# Patient Record
Sex: FEMALE | Race: WHITE | NOT HISPANIC OR LATINO | Employment: OTHER
[De-identification: names, ages, dates, MRNs, and addresses within clinical notes are randomized per-mention and may not be internally consistent; named-entity substitution may affect disease eponyms.]

---

## 2017-11-17 ENCOUNTER — OFFICE VISIT (OUTPATIENT)
Dept: OTOLARYNGOLOGY | Facility: CLINIC | Age: 67
End: 2017-11-17

## 2017-11-17 DIAGNOSIS — Z41.1 ENCOUNTER FOR COSMETIC SURGERY: Primary | ICD-10-CM

## 2017-11-17 NOTE — NURSING NOTE
2d and 3d photos taken.      PREPROCEDURE: Botox and Filler  Yes Patient ID verified with 2 identifiers (name and  or MRN)  Yes: Procedure and site verified with patient/designee  Yes: Accurate consent documentation in medical record  Yes: Marking not required. Reason: Provider is in continuous attendance with the patient from consent through completion of procedure.     TIME OUT:  Yes: Time-Out performed immediately prior to starting procedure, including verbal and active participation of all team members addressing:  * Correct patient identity  * Confirmed that the correct side and site are marked  * An accurate procedure consent form  * Agreement on the procedure to be done  * Correct patient position  * Relevant images and results are properly labeled and appropriately displayed  * The need to administer antibiotics or fluids for irrigation purposes during the procedure as applicable  * Safety precations based on patient history or medication use    DURING PROCEDURE: Verification of correct person, site, and procedure occurs any time the responsibility for care of the patient is transferred to another member of the care team.               Tanika Baer RN  2017 3:35 PM

## 2017-11-17 NOTE — PROGRESS NOTES
Facial Plastic and Reconstructive Surgery    Procedure: Dermal Filler for Facial Contouring  Indication: Facial volume deficit  Injector: Dr. Cari Weber    The risks and benefits associated with dermal filler were discussed. Informed consent was obtained.  The skin was cleaned with antimicrobial solution and a topical ice mask was placed.   A 25 gauge needle was used to establish the entry point of the 27 gauge canula. The canula was used to inject the  a subcutaneous plane.  Intermittent ice was used topically throughout the procedure. Hemostasis was obtained at the needle entry site with light digital pressure. Ice was then applied to the face by cool pack for 10 minutes. The skin was cleaned.    A total of 2 mls of Voluma filler was used. 1  on the right cheek and 1 on the left cheek.  1 mls of Belotero used periorally      Please see scanned procedure log.    There were no complications and the patient tolerated the procedure well.      Procedure: Chemodenervation with Botulinum Toxin A  Indication: Undesirable Dynamic Rhytids  Injector: Dr. Cari Weber    The skin was cleaned with antimicrobial solution and a topical ice mask was placed.   The patient was asked to systematically engage the muscles in the area to be injected. The tuberculin needles were used for subdermal injection and hemostasis was obtained with digital pressure when needed. The skin was cleaned.     A total of 40 units was injected subdermally. Areas treated were : crow's feet, mentalis, platysma, corrugators  Please see scanned procedure log.    The patient tolerated the procedure well and there were no complications.

## 2017-11-17 NOTE — LETTER
11/17/2017       RE: Santiago Woodall  [address] [facility] [place] [address]     Dear Colleague,    Thank you for referring your patient, Santiago Woodall, to the [facility] ENT [facility] at [facility]. Please see a copy of my visit note below.    Facial Plastic and Reconstructive Surgery    Procedure: Dermal Filler for Facial Contouring  Indication: Facial volume deficit  Injector: Dr. Cari Weber    The risks and benefits associated with dermal filler were discussed. Informed consent was obtained.  The skin was cleaned with antimicrobial solution and a topical ice mask was placed.   A 25 gauge needle was used to establish the entry point of the 27 gauge canula. The canula was used to inject the  a subcutaneous plane.  Intermittent ice was used topically throughout the procedure. Hemostasis was obtained at the needle entry site with light digital pressure. Ice was then applied to the face by cool pack for 10 minutes. The skin was cleaned.    A total of 2 mls of Voluma filler was used. 1  on the right cheek and 1 on the left cheek.  1 mls of Belotero used periorally      Please see scanned procedure log.    There were no complications and the patient tolerated the procedure well.      Procedure: Chemodenervation with Botulinum Toxin A  Indication: Undesirable Dynamic Rhytids  Injector: Dr. Cari Weber    The skin was cleaned with antimicrobial solution and a topical ice mask was placed.   The patient was asked to systematically engage the muscles in the area to be injected. The tuberculin needles were used for subdermal injection and hemostasis was obtained with digital pressure when needed. The skin was cleaned.     A total of 40 units was injected subdermally. Areas treated were : crow's feet, mentalis, platysma, corrugators  Please see scanned procedure log.    The patient tolerated the procedure well and there were no complications.      Again, thank you for allowing me  to participate in the care of your patient.      Sincerely,    Cari Weber MD

## 2018-06-19 ENCOUNTER — OFFICE VISIT (OUTPATIENT)
Dept: OTOLARYNGOLOGY | Facility: CLINIC | Age: 68
End: 2018-06-19

## 2018-06-19 DIAGNOSIS — Z41.1 ELECTIVE PROCEDURE FOR UNACCEPTABLE COSMETIC APPEARANCE: Primary | ICD-10-CM

## 2018-06-19 NOTE — LETTER
6/19/2018       RE: Santiago Woodall  [address] [facility] [address]     Dear Colleague,    Thank you for referring your patient, Santiago Woodall, to the [facility] ENT [facility] at [facility]. Please see a copy of my visit note below.    Facial Plastic and Reconstructive Surgery    Facial Plastic and Reconstructive Surgery    Santiago Woodall presents for evaluation for facial aging. The patient has noticed undesirable cosmetic changes in the both the development of wrinkles and the loss of volume to the face. Santiago Woodall presents for a consultation for possible dermal filler and chemodenervation with Botox.    The patients face was evaluated with the use of a mirror and a white make up pencil to highlight the areas of concern. A recommendation was made for the optimal treatment regimen. The patient was educated on the nature of the procedures.    Dermal fillers were explained as well as the canula technique. Informed consent was obtained and noted in the chart. All questions were answered  prior to commencement of the procedure.     Botox treatment was explained and informed consent was obtained. This was documented in the chart. All questions were answered prior to proceeding.     Procedure: Dermal Filler for Facial Contouring  Indication: Facial volume deficit  Injector: Dr. Cari Weber    The skin was cleaned with antimicrobial solution and a topical ice mask was placed.   A 26 gauge needle was used to establish the entry point of the 27 gauge canula. The canula was used to inject the  a subcutaneous plane.  Intermittent ice was used topically throughout the procedure. Hemostasis was obtained at the needle entry site with light digital pressure. Ice was then applied to the face by cool pack for 10 minutes. The skin was cleaned.    Voluma and Belotero were used.   Please see scanned procedure log.    There were no complications and the patient tolerated the procedure  well.    Procedure: Chemodenervation with Botulinum Toxin A  Indication: Undesirable Dynamic Rhytids  Injector: Dr. Cari Weber    The skin was cleaned with antimicrobial solution and a topical ice mask was placed.   The patient was asked to systematically engage the muscles in the area to be injected. The tuberculin needles were used for subdermal injection and hemostasis was obtained with digital pressure when needed. The skin was cleaned.     Please see scanned procedure log.    The patient tolerated the procedure well and there were no complications.           Again, thank you for allowing me to participate in the care of your patient.      Sincerely,    Cari Weber MD

## 2018-06-19 NOTE — NURSING NOTE
Updated 2D and 3D photos obtained.  Also obtained consent for Botox and dermal Filler - see treatment sheets.        PREPROCEDURE:  Botox and Filler  Yes Patient ID verified with 2 identifiers (name and  or MRN)  Yes: Procedure and site verified with patient/designee  Yes: Accurate consent documentation in medical record  Yes: Marking not required. Reason: Provider is in continuous attendance with the patient from consent through completion of procedure.     TIME OUT:  Yes: Time-Out performed immediately prior to starting procedure, including verbal and active participation of all team members addressing:  * Correct patient identity  * Confirmed that the correct side and site are marked  * An accurate procedure consent form  * Agreement on the procedure to be done  * Correct patient position  * Relevant images and results are properly labeled and appropriately displayed  * The need to administer antibiotics or fluids for irrigation purposes during the procedure as applicable  * Safety precations based on patient history or medication use    DURING PROCEDURE: Verification of correct person, site, and procedure occurs any time the responsibility for care of the patient is transferred to another member of the care team.             .  Tanika Baer RN  2018 5:47 PM

## 2018-07-05 NOTE — PROGRESS NOTES
Facial Plastic and Reconstructive Surgery    Facial Plastic and Reconstructive Surgery    Santiago Woodall presents for evaluation for facial aging. The patient has noticed undesirable cosmetic changes in the both the development of wrinkles and the loss of volume to the face. Santiago Woodall presents for a consultation for possible dermal filler and chemodenervation with Botox.    The patients face was evaluated with the use of a mirror and a white make up pencil to highlight the areas of concern. A recommendation was made for the optimal treatment regimen. The patient was educated on the nature of the procedures.    Dermal fillers were explained as well as the canula technique. Informed consent was obtained and noted in the chart. All questions were answered  prior to commencement of the procedure.     Botox treatment was explained and informed consent was obtained. This was documented in the chart. All questions were answered prior to proceeding.     Procedure: Dermal Filler for Facial Contouring  Indication: Facial volume deficit  Injector: Dr. Cari Weber    The skin was cleaned with antimicrobial solution and a topical ice mask was placed.   A 26 gauge needle was used to establish the entry point of the 27 gauge canula. The canula was used to inject the  a subcutaneous plane.  Intermittent ice was used topically throughout the procedure. Hemostasis was obtained at the needle entry site with light digital pressure. Ice was then applied to the face by cool pack for 10 minutes. The skin was cleaned.    Voluma and Belotero were used.   Please see scanned procedure log.    There were no complications and the patient tolerated the procedure well.    Procedure: Chemodenervation with Botulinum Toxin A  Indication: Undesirable Dynamic Rhytids  Injector: Dr. Cari Weber    The skin was cleaned with antimicrobial solution and a topical ice mask was placed.   The patient was asked to systematically engage  the muscles in the area to be injected. The tuberculin needles were used for subdermal injection and hemostasis was obtained with digital pressure when needed. The skin was cleaned.     Please see scanned procedure log.    The patient tolerated the procedure well and there were no complications.

## 2018-10-11 ENCOUNTER — DOCUMENTATION ONLY (OUTPATIENT)
Dept: PLASTIC SURGERY | Facility: CLINIC | Age: 68
End: 2018-10-11

## 2018-10-11 ENCOUNTER — OFFICE VISIT (OUTPATIENT)
Dept: OTOLARYNGOLOGY | Facility: CLINIC | Age: 68
End: 2018-10-11

## 2018-10-11 DIAGNOSIS — Z41.1 ENCOUNTER FOR COSMETIC PROCEDURE: Primary | ICD-10-CM

## 2018-10-11 NOTE — PROGRESS NOTES
Service Date: 10/11/2018      Ms. Woodall is back to see us today, and we talked about further improvement of rhytids and expression lines.  We used 1 mL of Restylane, and that was injected into the marionette lines and along the vermilion border of the upper lip.  We then placed Botox for 36 units into the  procerus, depressor supercilii and lateral orbital regions.  She will let us know how that works out, and she will see us on an as-needed basis.         LIDA ALATORRE MD             D: 10/11/2018   T: 10/11/2018   MT: yani      Name:     HEMAL WOODALL   MRN:      [id number]        Account:      [id number]   :      1950           Service Date: 10/11/2018      Document: [id number]

## 2018-10-11 NOTE — LETTER
10/11/2018       RE: Santiago Woodall  [address] [facility] [address]     Dear Colleague,    Thank you for referring your patient, Santiago Woodall, to the [facility] at [facility]. Please see a copy of my visit note below.    This office note has been dictated.     Again, thank you for allowing me to participate in the care of your patient.      Sincerely,    LIDA ALATORRE MD

## 2018-10-11 NOTE — PROGRESS NOTES
Reviewed filler and botox expectations and post care instructions.  Patient to schedule follow up if needed.   Irais Oliver, Patient Coordinator

## 2018-10-31 RX ORDER — RALOXIFENE HYDROCHLORIDE 60 MG/1
60 TABLET, FILM COATED ORAL
COMMUNITY
Start: 2018-01-05

## 2019-01-11 ENCOUNTER — OFFICE VISIT (OUTPATIENT)
Dept: OTOLARYNGOLOGY | Facility: CLINIC | Age: 69
End: 2019-01-11

## 2019-01-11 DIAGNOSIS — Z41.1 ENCOUNTER FOR COSMETIC PROCEDURE: Primary | ICD-10-CM

## 2019-01-11 NOTE — PROGRESS NOTES
Facial Plastic and Reconstructive Surgery    Santiago Woodall presents for evaluation for facial aging. The patient has noticed undesirable cosmetic changes in the both the development of wrinkles and the loss of volume to the face. Santiago Woodall presents for a consultation for possible dermal filler and chemodenervation with Botox.    The patients face was evaluated with the use of a mirror and a white make up pencil to highlight the areas of concern. A recommendation was made for the optimal treatment regimen. The patient was educated on the nature of the procedures.    Dermal fillers were explained as well as the canula technique. Informed consent was obtained and noted in the chart. All questions were answered  prior to commencement of the procedure.     Botox treatment was explained and informed consent was obtained. This was documented in the chart. All questions were answered prior to proceeding.     Procedure: Dermal Filler for Facial Contouring  Indication: Facial volume deficit  Injector: Dr. Cari Weber    The skin was cleaned with antimicrobial solution and a topical ice mask was placed.   A 25 gauge needle was used to establish the entry point of the 27 gauge canula. The canula was used to inject the  a subcutaneous plane.  Intermittent ice was used topically throughout the procedure. Hemostasis was obtained at the needle entry site with light digital pressure. Ice was then applied to the face by cool pack for 10 minutes. The skin was cleaned.    1ml of Voluma  0.5 ml of Volbella  1 ml of Belotero    There were no complications and the patient tolerated the procedure well.    Procedure: Chemodenervation with Botulinum Toxin A  Indication: Undesirable Dynamic Rhytids  Injector: Dr. Cari Weber    The skin was cleaned with antimicrobial solution and a topical ice mask was placed.   The patient was asked to systematically engage the muscles in the area to be injected. The tuberculin needles  were used for subdermal injection and hemostasis was obtained with digital pressure when needed. The skin was cleaned.     A total of 5 units was injected subdermally. Please see scanned procedure log.    The patient tolerated the procedure well and there were no complications.

## 2019-01-11 NOTE — LETTER
1/11/2019       RE: Santiago Woodall  [address] [facility] [address]     Dear Colleague,    Thank you for referring your patient, Santiago Woodall, to the [facility] ENT [facility] at [facility]. Please see a copy of my visit note below.    Facial Plastic and Reconstructive Surgery    Santiago Woodall presents for evaluation for facial aging. The patient has noticed undesirable cosmetic changes in the both the development of wrinkles and the loss of volume to the face. Santiago Woodall presents for a consultation for possible dermal filler and chemodenervation with Botox.    The patients face was evaluated with the use of a mirror and a white make up pencil to highlight the areas of concern. A recommendation was made for the optimal treatment regimen. The patient was educated on the nature of the procedures.    Dermal fillers were explained as well as the canula technique. Informed consent was obtained and noted in the chart. All questions were answered  prior to commencement of the procedure.     Botox treatment was explained and informed consent was obtained. This was documented in the chart. All questions were answered prior to proceeding.     Procedure: Dermal Filler for Facial Contouring  Indication: Facial volume deficit  Injector: Dr. Cari Weber    The skin was cleaned with antimicrobial solution and a topical ice mask was placed.   A 25 gauge needle was used to establish the entry point of the 27 gauge canula. The canula was used to inject the  a subcutaneous plane.  Intermittent ice was used topically throughout the procedure. Hemostasis was obtained at the needle entry site with light digital pressure. Ice was then applied to the face by cool pack for 10 minutes. The skin was cleaned.    1ml of Voluma  0.5 ml of Volbella  1 ml of Belotero    There were no complications and the patient tolerated the procedure well.    Procedure: Chemodenervation with Botulinum Toxin  A  Indication: Undesirable Dynamic Rhytids  Injector: Dr. Cari Weber    The skin was cleaned with antimicrobial solution and a topical ice mask was placed.   The patient was asked to systematically engage the muscles in the area to be injected. The tuberculin needles were used for subdermal injection and hemostasis was obtained with digital pressure when needed. The skin was cleaned.     A total of 5 units was injected subdermally. Please see scanned procedure log.    The patient tolerated the procedure well and there were no complications.     Again, thank you for allowing me to participate in the care of your patient.      Sincerely,    Cari Weber MD

## 2019-01-14 NOTE — NURSING NOTE
Updated photodocumentation obtained.      Obtained written and verbal consent for Dermal Filler.  Discussed signs and symptoms of Intravascular occlusion and to call clinic immediately should there be any concern or questions.  Also discussed the other risk factors including but not limited to bruising, swelling, infection, soft tissue necrosis, blindness, pain, redness.    Prepped for procedure with TechniCare Chlorhexidine topical solution to skin, applied topical Prilocaine and Lidocaine for pre-procedure numbing.  Used intermittent ice for comfort.  Patient tolerated procedure well.     Post procedure instructions discussed with patient.  No make-up for 4 hours, drink plenty of water to stay hydrated.  Avoid any retinols for 48 hours.  Hold off off on any skin treatments such as Microneedling or Hydrafacials for 5 days after unless directed otherwise by your physician.    Tanika Baer RN  1/14/2019 10:05 AM

## 2019-05-02 ENCOUNTER — OFFICE VISIT (OUTPATIENT)
Dept: PLASTIC SURGERY | Facility: CLINIC | Age: 69
End: 2019-05-02

## 2019-05-02 DIAGNOSIS — Z41.1 ENCOUNTER FOR COSMETIC PROCEDURE: Primary | ICD-10-CM

## 2019-05-02 NOTE — NURSING NOTE
Chief Complaint   Patient presents with     RECHECK     botox and filler     Patient likes her results and is here for more botox and filler.  We reviewed post care and expectations and she will follow up as needed. Irias Oliver, Patient Coordinator

## 2019-05-02 NOTE — LETTER
2019      RE: Hemal Woodall  [address] [facility] [address]     Service Date: 2019      Ms. Woodall is in today and we placed 36 units of Botox into the , procerus and frontalis areas, as well as lateral orbit and then 1 mL of Restylane soak was placed in the oral commissure group, marionette lines, secondary smile lines and along the vermilion border of the upper lip.  None was placed in the lower lip as she has an active herpetic eruption in the midline.  This not an area we generally treat anyway.  She had Voluma, Volbella as well as Belotero in the past.  She is now back for the summer from  and she has significant tanning but has tried to protect her face in her journeys there.         LIDA ALATORRE MD             D: 2019   T: 2019   MT: zhanna      Name:     HEMAL WOODALL   MRN:      [id number]        Account:      [id number]   :      1950           Service Date: 2019      Document: [id number]       LIDA ALATORRE MD

## 2019-05-02 NOTE — LETTER
May 2, 2019  Re: Santiago Woodall  1950    Dear Dr. Rendon,    Thank you so much for referring Santiago Woodall to the [facility]. I had the pleasure of visiting with Santiago today.     Attached you will find a copy of my note. Please feel free to reach out to me with any questions, [phone].     This office note has been dictated.       Your trust in our practice and care is much appreciated.    Sincerely,  LIDA ALATORRE MD

## 2019-05-03 NOTE — PROGRESS NOTES
Service Date: 2019      Ms. Woodall is in today and we placed 36 units of Botox into the , procerus and frontalis areas, as well as lateral orbit and then 1 mL of Restylane soak was placed in the oral commissure group, marionette lines, secondary smile lines and along the vermilion border of the upper lip.  None was placed in the lower lip as she has an active herpetic eruption in the midline.  This not an area we generally treat anyway.  She had Voluma, Volbella as well as Belotero in the past.  She is now back for the summer from [place] and she has significant tanning but has tried to protect her face in her journeys there.         LIDA ALATORRE MD             D: 2019   T: 2019   MT: zhanna      Name:     HEMAL WOODALL   MRN:      [id number]        Account:      [id number]   :      1950           Service Date: 2019      Document: [id number]

## 2019-08-02 ENCOUNTER — OFFICE VISIT (OUTPATIENT)
Dept: PLASTIC SURGERY | Facility: CLINIC | Age: 69
End: 2019-08-02

## 2019-08-02 DIAGNOSIS — Z41.1 ENCOUNTER FOR COSMETIC PROCEDURE: Primary | ICD-10-CM

## 2019-08-02 NOTE — NURSING NOTE
Obtained written and verbal consent for Dermal Filler.  Discussed signs and symptoms of Intravascular occlusion and to call clinic immediately should there be any concern or questions.  Also discussed the other risk factors including but not limited to bruising, swelling, infection, soft tissue necrosis, blindness, pain, redness.    Prepped for procedure with TechniCare Chlorhexidine topical solution to skin, applied topical Prilocaine and Lidocaine for pre-procedure numbing.  Used intermittent ice for comfort.  Patient tolerated procedure well.     Post procedure instructions discussed with patient.  No make-up for 4 hours, drink plenty of water to stay hydrated.  Avoid any retinols for 48 hours.  Hold off off on any skin treatments such as Microneedling or Hydrafacials for 5 days after unless directed otherwise by your physician.    Patient is interested in consult for Mini Facelift.  Will do that at her next Botox appt in 3 months.    Tanika Baer RN  8/2/2019 4:40 PM

## 2019-08-02 NOTE — LETTER
August 2, 2019  Re: Santiago Woodall  1950    Facial Plastic and Reconstructive Surgery    Procedure: Dermal Filler for Facial Contouring  Indication: Facial volume deficit  Injector: Cari Weber    The risks and benefits associated with dermal filler were discussed. Informed consent was obtained specifically addressing risks including intravascular injection. The skin was cleaned with antimicrobial solution and a topical ice was placed. If the patient elected topical anesthetic solution, this was placed.     A needle was used to establish the entry point of the canula. The canula and needle were used to inject the filler with slow cautious consistent flow with careful attention to the danger zones of the face.      Intermittent ice was used topically throughout the procedure. Hemostasis was obtained at the needle entry site with light digital pressure. The skin was cleaned.    Filler Type: Radiesse   Total Amount of Filler: Voluma    Please see procedure log.    There were no complications and the patient tolerated the procedure well.  Post procedure care instructions were given to the patient.    Facial Plastic and Reconstructive Surgery    Procedure: Chemodenervation with Botulinum Toxin A  Indication: Undesirable Dynamic Rhytids  Injector: Cari Weber    Informed consent was obtained.  The skin was cleaned with antimicrobial solution and a topical ice was placed.     The patient was asked to systematically engage the muscles in the area to be injected. The tuberculin needles were used for subdermal injection and hemostasis was obtained with light digital pressure when needed. The skin was cleaned.     A total of 30 units were injected.  Botulinum toxin A type: Botox    Please see procedure log.    The patient tolerated the procedure well and there were no complications. Post procedure care instructions were given to the patient.    Your trust in our practice and care is much  appreciated.    Sincerely,  Cari Weber MD

## 2019-08-02 NOTE — PROGRESS NOTES
Facial Plastic and Reconstructive Surgery    Procedure: Dermal Filler for Facial Contouring  Indication: Facial volume deficit  Injector: Cari Weber      The risks and benefits associated with dermal filler were discussed. Informed consent was obtained specifically addressing risks including intravascular injection. The skin was cleaned with antimicrobial solution and a topical ice was placed. If the patient elected topical anesthetic solution, this was placed.     A needle was used to establish the entry point of the canula. The canula and needle were used to inject the filler with slow cautious consistent flow with careful attention to the danger zones of the face.      Intermittent ice was used topically throughout the procedure. Hemostasis was obtained at the needle entry site with light digital pressure. The skin was cleaned.    Filler Type: Radiesse   Total Amount of Filler: Voluma    Please see procedure log.    There were no complications and the patient tolerated the procedure well.  Post procedure care instructions were given to the patient.    Facial Plastic and Reconstructive Surgery    Procedure: Chemodenervation with Botulinum Toxin A  Indication: Undesirable Dynamic Rhytids  Injector: Cari Weber      Informed consent was obtained.  The skin was cleaned with antimicrobial solution and a topical ice was placed.     The patient was asked to systematically engage the muscles in the area to be injected. The tuberculin needles were used for subdermal injection and hemostasis was obtained with light digital pressure when needed. The skin was cleaned.     A total of 30 units were injected.  Botulinum toxin A type: Botox    Please see procedure log.    The patient tolerated the procedure well and there were no complications. Post procedure care instructions were given to the patient.

## 2020-01-17 ENCOUNTER — OFFICE VISIT (OUTPATIENT)
Dept: PLASTIC SURGERY | Facility: CLINIC | Age: 70
End: 2020-01-17

## 2020-01-17 DIAGNOSIS — Z41.1 ENCOUNTER FOR COSMETIC PROCEDURE: Primary | ICD-10-CM

## 2020-01-17 NOTE — LETTER
1/17/2020       RE: Santiago Woodall  [address] [address]     Dear Colleague,    Thank you for referring your patient, Santiago Woodall, to the [facility] at [facility]. Please see a copy of my visit note below.    Facial Plastic and Reconstructive Surgery    Santiago Woodall presents for evaluation for facial aging. The patient has noticed undesirable cosmetic changes in the both the development of wrinkles and the loss of volume to the face. Santiago Woodall presents for a consultation for possible dermal filler and chemodenervation with Botox.    The patients face was evaluated with the use of a mirror and a white make up pencil to highlight the areas of concern. A recommendation was made for the optimal treatment regimen. The patient was educated on the nature of the procedures.    Dermal fillers were explained as well as the canula technique. Informed consent was obtained and noted in the chart. All questions were answered  prior to commencement of the procedure.     Botox treatment was explained and informed consent was obtained. This was documented in the chart. All questions were answered prior to proceeding.     Procedure: Dermal Filler for Facial Contouring  Indication: Facial volume deficit  Injector: Dr. Cari Weber    The skin was cleaned with antimicrobial solution and a topical ice mask was placed.   A 27 gauge needle was used to inject the  a subcutaneous plane.  Intermittent ice was used topically throughout the procedure. Hemostasis was obtained at the needle entry site with light digital pressure. Ice was then applied to the face by cool pack for 10 minutes. The skin was cleaned.    A total of 0.8 mls filler of Radiesse was used.    1 ml of Belotero  Please see scanned procedure log.    There were no complications and the patient tolerated the procedure well.    Procedure: Chemodenervation with Botulinum Toxin A  Indication: Undesirable  Dynamic Rhytids  Injector: Dr. Cari Weber    The skin was cleaned with antimicrobial solution and a topical ice mask was placed.   The patient was asked to systematically engage the muscles in the area to be injected. The tuberculin needles were used for subdermal injection and hemostasis was obtained with digital pressure when needed. The skin was cleaned.     A total of 30 units was injected subdermally. Areas treated were : Botox  Please see scanned procedure log.    The patient tolerated the procedure well and there were no complications.     Again, thank you for allowing me to participate in the care of your patient.      Sincerely,    Cari Weber MD

## 2020-01-17 NOTE — NURSING NOTE
Obtained consent for Botulinum Toxin.  Discussed possible side effects including but not limited to unwanted facial weakness or paralysis, bruising, swelling, redness, pain, upper eyelid ptosis.  Botox will begin to work in 4 - 5 days, fully working by 10 days.  Botox lasts for approximately 3 months.      Prepped skin with isopropyl alcohol and gauze, intermittent ice for comfort.  Patient tolerated procedure well.        Obtained written and verbal consent for Dermal Filler.  Discussed signs and symptoms of Intravascular occlusion and to call clinic immediately should there be any concern or questions.  Also discussed the other risk factors including but not limited to bruising, swelling, infection, soft tissue necrosis, blindness, pain, redness.    Prepped for procedure with [employer]iCare Chlorhexidine topical solution to skin, applied topical Prilocaine and Lidocaine for pre-procedure numbing.  Used intermittent ice for comfort.  Patient tolerated procedure well.     Post procedure instructions discussed with patient.  No make-up for 4 hours, drink plenty of water to stay hydrated.  Avoid any retinols for 48 hours.  Hold off off on any skin treatments such as Microneedling or Hydrafacials for 5 days after unless directed otherwise by your physician.    Received: 0.8 mL Radiesse                       1mL Jessica Martin RN  1/17/2020 2:53 PM

## 2020-01-17 NOTE — PROGRESS NOTES
Facial Plastic and Reconstructive Surgery    Santiago Woodall presents for evaluation for facial aging. The patient has noticed undesirable cosmetic changes in the both the development of wrinkles and the loss of volume to the face. Santiago Woodall presents for a consultation for possible dermal filler and chemodenervation with Botox.    The patients face was evaluated with the use of a mirror and a white make up pencil to highlight the areas of concern. A recommendation was made for the optimal treatment regimen. The patient was educated on the nature of the procedures.    Dermal fillers were explained as well as the canula technique. Informed consent was obtained and noted in the chart. All questions were answered  prior to commencement of the procedure.     Botox treatment was explained and informed consent was obtained. This was documented in the chart. All questions were answered prior to proceeding.     Procedure: Dermal Filler for Facial Contouring  Indication: Facial volume deficit  Injector: Dr. Cari Weber    The skin was cleaned with antimicrobial solution and a topical ice mask was placed.   A 27 gauge needle was used to inject the  a subcutaneous plane.  Intermittent ice was used topically throughout the procedure. Hemostasis was obtained at the needle entry site with light digital pressure. Ice was then applied to the face by cool pack for 10 minutes. The skin was cleaned.    A total of 0.8 mls filler of Radiesse was used.    1 ml of Belotero  Please see scanned procedure log.    There were no complications and the patient tolerated the procedure well.    Procedure: Chemodenervation with Botulinum Toxin A  Indication: Undesirable Dynamic Rhytids  Injector: Dr. Cari Weber    The skin was cleaned with antimicrobial solution and a topical ice mask was placed.   The patient was asked to systematically engage the muscles in the area to be injected. The tuberculin needles were used for  subdermal injection and hemostasis was obtained with digital pressure when needed. The skin was cleaned.     A total of 30 units was injected subdermally. Areas treated were : Botox  Please see scanned procedure log.    The patient tolerated the procedure well and there were no complications.

## 2020-01-17 NOTE — LETTER
January 17, 2020  Re: Santiago Woodall  1950    Facial Plastic and Reconstructive Surgery    Santiago Woodall presents for evaluation for facial aging. The patient has noticed undesirable cosmetic changes in the both the development of wrinkles and the loss of volume to the face. Santiago Woodall presents for a consultation for possible dermal filler and chemodenervation with Botox.    The patients face was evaluated with the use of a mirror and a white make up pencil to highlight the areas of concern. A recommendation was made for the optimal treatment regimen. The patient was educated on the nature of the procedures.    Dermal fillers were explained as well as the canula technique. Informed consent was obtained and noted in the chart. All questions were answered  prior to commencement of the procedure.     Botox treatment was explained and informed consent was obtained. This was documented in the chart. All questions were answered prior to proceeding.     Procedure: Dermal Filler for Facial Contouring  Indication: Facial volume deficit  Injector: Dr. Cari Weber    The skin was cleaned with antimicrobial solution and a topical ice mask was placed.   A 27 gauge needle was used to inject the  a subcutaneous plane.  Intermittent ice was used topically throughout the procedure. Hemostasis was obtained at the needle entry site with light digital pressure. Ice was then applied to the face by cool pack for 10 minutes. The skin was cleaned.    A total of 0.8 mls filler of Radiesse was used.    1 ml of Belotero  Please see scanned procedure log.    There were no complications and the patient tolerated the procedure well.    Procedure: Chemodenervation with Botulinum Toxin A  Indication: Undesirable Dynamic Rhytids  Injector: Dr. Cari Weber    The skin was cleaned with antimicrobial solution and a topical ice mask was placed.   The patient was asked to systematically engage the muscles in the area to be  injected. The tuberculin needles were used for subdermal injection and hemostasis was obtained with digital pressure when needed. The skin was cleaned.     A total of 30 units was injected subdermally. Areas treated were : Botox  Please see scanned procedure log.    The patient tolerated the procedure well and there were no complications.     Your trust in our practice and care is much appreciated.    Sincerely,  Cari Weber MD

## 2020-09-11 ENCOUNTER — OFFICE VISIT (OUTPATIENT)
Dept: PLASTIC SURGERY | Facility: CLINIC | Age: 70
End: 2020-09-11

## 2020-09-11 DIAGNOSIS — Z41.1 ENCOUNTER FOR COSMETIC PROCEDURE: Primary | ICD-10-CM

## 2020-09-11 NOTE — PROGRESS NOTES
Facial Plastic and Reconstructive Surgery    Procedure: Chemodenervation with Botulinum Toxin A  Indication: Undesirable Dynamic Rhytids  Injector: Cari Weber MD      Informed consent was obtained.  The skin was cleaned with antimicrobial solution and a topical ice was placed.     The patient was asked to systematically engage the muscles in the area to be injected. The tuberculin needles were used for subdermal injection and hemostasis was obtained with light digital pressure when needed. The skin was cleaned.     A total of 45 units were injected.  Botulinum toxin A type: Botox    Please see procedure log.    The patient tolerated the procedure well and there were no complications. Post procedure care instructions were given to the patient.      Facial Plastic and Reconstructive Surgery    Procedure: Dermal Filler for Facial Contouring  Indication: Facial volume deficit  Injector: Cari Weber MD      The risks and benefits associated with dermal filler were discussed. Informed consent was obtained specifically addressing risks including intravascular injection. The skin was cleaned with antimicrobial solution and a topical ice was placed. If the patient elected topical anesthetic solution, this was placed.     A needle was used to establish the entry point of the canula. The canula and needle were used to inject the filler with slow cautious consistent flow with careful attention to the danger zones of the face.      Intermittent ice was used topically throughout the procedure. Hemostasis was obtained at the needle entry site with light digital pressure. The skin was cleaned.    Filler Type: Belotero and Diluted restylane    Please see procedure log.    There were no complications and the patient tolerated the procedure well.  Post procedure care instructions were given to the patient.    I also removed a large pore from her chest.

## 2020-09-11 NOTE — LETTER
September 11, 2020  Re: Santiago Woodall  1950    Dear Dr. Rendon,    Thank you so much for referring Santiago Woodall to the [facility]. I had the pleasure of visiting with Santiago today.     Attached you will find a copy of my note. Please feel free to reach out to me with any questions, [phone].     Facial Plastic and Reconstructive Surgery    Procedure: Chemodenervation with Botulinum Toxin A  Indication: Undesirable Dynamic Rhytids  Injector: Cari Weber MD      Informed consent was obtained.  The skin was cleaned with antimicrobial solution and a topical ice was placed.     The patient was asked to systematically engage the muscles in the area to be injected. The tuberculin needles were used for subdermal injection and hemostasis was obtained with light digital pressure when needed. The skin was cleaned.     A total of 45 units were injected.  Botulinum toxin A type: Botox    Please see procedure log.    The patient tolerated the procedure well and there were no complications. Post procedure care instructions were given to the patient.      Facial Plastic and Reconstructive Surgery    Procedure: Dermal Filler for Facial Contouring  Indication: Facial volume deficit  Injector: Cari Weber MD      The risks and benefits associated with dermal filler were discussed. Informed consent was obtained specifically addressing risks including intravascular injection. The skin was cleaned with antimicrobial solution and a topical ice was placed. If the patient elected topical anesthetic solution, this was placed.     A needle was used to establish the entry point of the canula. The canula and needle were used to inject the filler with slow cautious consistent flow with careful attention to the danger zones of the face.      Intermittent ice was used topically throughout the procedure. Hemostasis was obtained at the needle entry site with light digital pressure. The skin was cleaned.    Filler Type:  Belotero and Diluted restylane    Please see procedure log.    There were no complications and the patient tolerated the procedure well.  Post procedure care instructions were given to the patient.    I also removed a large pore from her chest.     Your trust in our practice and care is much appreciated.    Sincerely,  Cari Weber MD

## 2020-09-11 NOTE — LETTER
9/11/2020       RE: Santiago Woodall  [address] [facility] [address]     Dear Colleague,    Thank you for referring your patient, Santiago Woodall, to the [facility] at [facility]. Please see a copy of my visit note below.    Facial Plastic and Reconstructive Surgery    Procedure: Chemodenervation with Botulinum Toxin A  Indication: Undesirable Dynamic Rhytids  Injector: Cari Weber MD      Informed consent was obtained.  The skin was cleaned with antimicrobial solution and a topical ice was placed.     The patient was asked to systematically engage the muscles in the area to be injected. The tuberculin needles were used for subdermal injection and hemostasis was obtained with light digital pressure when needed. The skin was cleaned.     A total of 45 units were injected.  Botulinum toxin A type: Botox    Please see procedure log.    The patient tolerated the procedure well and there were no complications. Post procedure care instructions were given to the patient.      Facial Plastic and Reconstructive Surgery    Procedure: Dermal Filler for Facial Contouring  Indication: Facial volume deficit  Injector: Cari Weber MD      The risks and benefits associated with dermal filler were discussed. Informed consent was obtained specifically addressing risks including intravascular injection. The skin was cleaned with antimicrobial solution and a topical ice was placed. If the patient elected topical anesthetic solution, this was placed.     A needle was used to establish the entry point of the canula. The canula and needle were used to inject the filler with slow cautious consistent flow with careful attention to the danger zones of the face.      Intermittent ice was used topically throughout the procedure. Hemostasis was obtained at the needle entry site with light digital pressure. The skin was cleaned.    Filler Type: Belotero and Diluted restylane    Please  see procedure log.    There were no complications and the patient tolerated the procedure well.  Post procedure care instructions were given to the patient.    I also removed a large pore from her chest.     Again, thank you for allowing me to participate in the care of your patient.      Sincerely,    Cari Weber MD

## 2020-11-06 ENCOUNTER — OFFICE VISIT (OUTPATIENT)
Dept: PLASTIC SURGERY | Facility: CLINIC | Age: 70
End: 2020-11-06

## 2020-11-06 DIAGNOSIS — Z41.1 ENCOUNTER FOR COSMETIC PROCEDURE: Primary | ICD-10-CM

## 2020-11-06 NOTE — LETTER
11/6/2020       RE: Santiago Woodall  [address] [facility] [address]     Dear Colleague,    Thank you for referring your patient, Santiago Woodall, to the [facility] [place] at [facility]. Please see a copy of my visit note below.    Facial Plastic and Reconstructive Surgery    Santiago Woodall presents for evaluation for facial aging. The patient has noticed undesirable cosmetic changes in the both the development of wrinkles and the loss of volume to the face. Santiago Woodall presents for a consultation for possible dermal filler and chemodenervation with Botox.    The patients face was evaluated with the use of a mirror and a white make up pencil to highlight the areas of concern. A recommendation was made for the optimal treatment regimen. The patient was educated on the nature of the procedures.    Dermal fillers were explained as well as the canula technique. Informed consent was obtained and noted in the chart. All questions were answered  prior to commencement of the procedure.     Botox treatment was explained and informed consent was obtained. This was documented in the chart. All questions were answered prior to proceeding.     Procedure: Dermal Filler for Facial Contouring  Indication: Facial volume deficit  Injector: Dr. Cari Weber    The skin was cleaned with antimicrobial solution and a topical ice mask was placed.   A 25 gauge needle was used to establish the entry point of the 27 gauge canula. The canula was used to inject the  a subcutaneous plane.  Intermittent ice was used topically throughout the procedure. Hemostasis was obtained at the needle entry site with light digital pressure. Ice was then applied to the face by cool pack for 10 minutes. The skin was cleaned.    A total of 1  mls of Voluma filler was used.    Please see scanned procedure log.    There were no complications and the patient tolerated the procedure well.    Procedure:  Chemodenervation with Botulinum Toxin A  Indication: Undesirable Dynamic Rhytids  Injector: Dr. Cari Weber    The skin was cleaned with antimicrobial solution and a topical ice mask was placed.   The patient was asked to systematically engage the muscles in the area to be injected. The tuberculin needles were used for subdermal injection and hemostasis was obtained with digital pressure when needed. The skin was cleaned.     A total of 12 units was injected subdermally.   Please see scanned procedure log.    The patient tolerated the procedure well and there were no complications.     I also injected 10 units of Hylenex to the malar bags    Again, thank you for allowing me to participate in the care of your patient.      Sincerely,    Cari Weber MD

## 2020-11-06 NOTE — PROGRESS NOTES
Facial Plastic and Reconstructive Surgery    Santiago Woodall presents for evaluation for facial aging. The patient has noticed undesirable cosmetic changes in the both the development of wrinkles and the loss of volume to the face. Santiago Woodall presents for a consultation for possible dermal filler and chemodenervation with Botox.    The patients face was evaluated with the use of a mirror and a white make up pencil to highlight the areas of concern. A recommendation was made for the optimal treatment regimen. The patient was educated on the nature of the procedures.    Dermal fillers were explained as well as the canula technique. Informed consent was obtained and noted in the chart. All questions were answered  prior to commencement of the procedure.     Botox treatment was explained and informed consent was obtained. This was documented in the chart. All questions were answered prior to proceeding.     Procedure: Dermal Filler for Facial Contouring  Indication: Facial volume deficit  Injector: Dr. Cari Weber    The skin was cleaned with antimicrobial solution and a topical ice mask was placed.   A 25 gauge needle was used to establish the entry point of the 27 gauge canula. The canula was used to inject the  a subcutaneous plane.  Intermittent ice was used topically throughout the procedure. Hemostasis was obtained at the needle entry site with light digital pressure. Ice was then applied to the face by cool pack for 10 minutes. The skin was cleaned.    A total of 1  mls of Voluma filler was used.    Please see scanned procedure log.    There were no complications and the patient tolerated the procedure well.    Procedure: Chemodenervation with Botulinum Toxin A  Indication: Undesirable Dynamic Rhytids  Injector: Dr. Cari Weber    The skin was cleaned with antimicrobial solution and a topical ice mask was placed.   The patient was asked to systematically engage the muscles in the area to be  injected. The tuberculin needles were used for subdermal injection and hemostasis was obtained with digital pressure when needed. The skin was cleaned.     A total of 12 units was injected subdermally.   Please see scanned procedure log.    The patient tolerated the procedure well and there were no complications.     I also injected 10 units of Hylenex to the malar bags

## 2020-11-06 NOTE — LETTER
November 6, 2020  Re: Santiago Woodall  1950    Dear Dr. Rendon,    Thank you so much for referring Santiago Woodall to the [facility]. I had the pleasure of visiting with Santiago today.     Attached you will find a copy of my note. Please feel free to reach out to me with any questions, [phone].     Facial Plastic and Reconstructive Surgery    Santiago Woodall presents for evaluation for facial aging. The patient has noticed undesirable cosmetic changes in the both the development of wrinkles and the loss of volume to the face. Santiago Woodall presents for a consultation for possible dermal filler and chemodenervation with Botox.    The patients face was evaluated with the use of a mirror and a white make up pencil to highlight the areas of concern. A recommendation was made for the optimal treatment regimen. The patient was educated on the nature of the procedures.    Dermal fillers were explained as well as the canula technique. Informed consent was obtained and noted in the chart. All questions were answered  prior to commencement of the procedure.     Botox treatment was explained and informed consent was obtained. This was documented in the chart. All questions were answered prior to proceeding.     Procedure: Dermal Filler for Facial Contouring  Indication: Facial volume deficit  Injector: Dr. Cari Weber    The skin was cleaned with antimicrobial solution and a topical ice mask was placed.   A 25 gauge needle was used to establish the entry point of the 27 gauge canula. The canula was used to inject the  a subcutaneous plane.  Intermittent ice was used topically throughout the procedure. Hemostasis was obtained at the needle entry site with light digital pressure. Ice was then applied to the face by cool pack for 10 minutes. The skin was cleaned.    A total of 1  mls of Voluma filler was used.    Please see scanned procedure log.    There were no complications and the patient tolerated the  procedure well.    Procedure: Chemodenervation with Botulinum Toxin A  Indication: Undesirable Dynamic Rhytids  Injector: Dr. Cari Weber    The skin was cleaned with antimicrobial solution and a topical ice mask was placed.   The patient was asked to systematically engage the muscles in the area to be injected. The tuberculin needles were used for subdermal injection and hemostasis was obtained with digital pressure when needed. The skin was cleaned.     A total of 12 units was injected subdermally.   Please see scanned procedure log.    The patient tolerated the procedure well and there were no complications.     I also injected 10 units of Hylenex to the malar bags    Your trust in our practice and care is much appreciated.    Sincerely,  Cari Weber MD

## 2020-12-11 ENCOUNTER — OFFICE VISIT (OUTPATIENT)
Dept: PLASTIC SURGERY | Facility: CLINIC | Age: 70
End: 2020-12-11

## 2020-12-11 DIAGNOSIS — Z41.1 ENCOUNTER FOR COSMETIC PROCEDURE: Primary | ICD-10-CM

## 2020-12-11 NOTE — PROGRESS NOTES
Facial Plastic and Reconstructive Surgery      Santiago Woodall presents today for follow up from tear trough filler  She has had malar bags develop.     I injected some hyaluronidase today. I placed this bilaterally to the malar bags.   A total of 10 units were injected. She tolerated the procedure weill.         05:30

## 2020-12-11 NOTE — LETTER
January 11, 2021  Re: Santiago Woodall  1950    Dear Dr. Rendon,    Thank you so much for referring Santiago Woodall to the [facility]. I had the pleasure of visiting with Santiago today.     Attached you will find a copy of my note. Please feel free to reach out to me with any questions, [phone].     Facial Plastic and Reconstructive Surgery      Santiago Woodall presents today for follow up from tear trough filler  She has had malar bags develop.     I injected some hyaluronidase today. I placed this bilaterally to the malar bags.   A total of 10 units were injected. She tolerated the procedure weill.      Your trust in our practice and care is much appreciated.    Sincerely,  Cari Weber MD

## 2020-12-11 NOTE — LETTER
12/11/2020       RE: Santiago Woodall  [address] [address]     Dear Colleague,    Thank you for referring your patient, Santiago Woodall, to the [facility]. Please see a copy of my visit note below.    Facial Plastic and Reconstructive Surgery      Santiago Woodall presents today for follow up from tear trough filler  She has had malar bags develop.     I injected some hyaluronidase today. I placed this bilaterally to the malar bags.   A total of 10 units were injected. She tolerated the procedure weill.            Again, thank you for allowing me to participate in the care of your patient.      Sincerely,    Cari Weber MD

## 2021-05-28 ENCOUNTER — OFFICE VISIT (OUTPATIENT)
Dept: PLASTIC SURGERY | Facility: CLINIC | Age: 71
End: 2021-05-28

## 2021-05-28 DIAGNOSIS — Z41.1 ENCOUNTER FOR COSMETIC PROCEDURE: Primary | ICD-10-CM

## 2021-07-13 NOTE — PROGRESS NOTES
Facial Plastic and Reconstructive Surgery    Procedure: Dermal Filler for Facial Contouring  Indication: Facial volume deficit  Injector: Cari Weber MD      The risks and benefits associated with dermal filler were discussed. Informed consent was obtained specifically addressing risks including intravascular injection. The skin was cleaned with antimicrobial solution and a topical ice was placed. If the patient elected topical anesthetic solution, this was placed.     A needle was used to establish the entry point of the canula. The canula and needle were used to inject the filler with slow cautious consistent flow with careful attention to the danger zones of the face.      Intermittent ice was used topically throughout the procedure. Hemostasis was obtained at the needle entry site with light digital pressure. The skin was cleaned.    Filler Type: Belotero, Voluma     Please see procedure log.    There were no complications and the patient tolerated the procedure well.  Post procedure care instructions were given to the patient.      Facial Plastic and Reconstructive Surgery    Procedure: Chemodenervation with Botulinum Toxin A  Indication: Undesirable Dynamic Rhytids  Injector: Cari Weber MD      Informed consent was obtained.  The skin was cleaned with antimicrobial solution and a topical ice was placed.     The patient was asked to systematically engage the muscles in the area to be injected. The tuberculin needles were used for subdermal injection and hemostasis was obtained with light digital pressure when needed. The skin was cleaned.     A total of 57 units were injected.  Botulinum toxin A type: Botox    Please see procedure log.    The patient tolerated the procedure well and there were no complications. Post procedure care instructions were given to the patient.

## 2021-09-02 ENCOUNTER — OFFICE VISIT (OUTPATIENT)
Dept: PLASTIC SURGERY | Facility: CLINIC | Age: 71
End: 2021-09-02

## 2021-09-02 DIAGNOSIS — Z41.1 ENCOUNTER FOR COSMETIC PROCEDURE: Primary | ICD-10-CM

## 2021-09-02 NOTE — LETTER
9/2/2021       RE: Santiago Woodall  [address] [address]     Dear Colleague,    Thank you for referring your patient, Santiago Woodall, to the [facility]. Please see a copy of my visit note below.    This office note has been dictated. This office note has been dictated.     20 units of Botox Placed in glabella with usual sterile technique  LIDA SHARP MD      Service Date: 09/02/2021    Santiago is in today.  She has been delighted with the filler and botox treatments with Dr. Cari Weber.  She noted after an injection in the malar area, she ended up with significant lymphedema and malar bag formations.  This was most pronounced in December and she brought in pictures of it.  Since that time, Dr. Weber has made some other injections that I cannot find in the chart, but with time, the edema is becoming less problematic.  It is more noticeable on the right than the left.  It is more of a problem in the morning or late in the day.  Malar pouches can be increased with age.  To have this occur after filler placed lower in the lid and malar areas is uncommon.  I am going to investigate this with one of my colleagues, Dr. Coreas and get his thoughts.  I told her I would be back to her after that.      Lida Sharp MD

## 2021-09-02 NOTE — LETTER
2021  Re: Hemal Storm  1950    Dear Dr. Rendon,    Thank you so much for referring Hemal Storm to the [facility]. I had the pleasure of visiting with Hemal today.     Attached you will find a copy of my note. Please feel free to reach out to me with any questions, [phone].     This office note has been dictated. This office note has been dictated.     20 units of Botox Placed in glabella with usual sterile technique  LIDA SHARP MD      Service Date: 2021    Hemal is in today.  She has been delighted with the filler and botox treatments with Dr. Cari Weber.  She noted after an injection in the malar area, she ended up with significant lymphedema and malar bag formations.  This was most pronounced in December and she brought in pictures of it.  Since that time, Dr. Weber has made some other injections that I cannot find in the chart, but with time, the edema is becoming less problematic.  It is more noticeable on the right than the left.  It is more of a problem in the morning or late in the day.  Malar pouches can be increased with age.  To have this occur after filler placed lower in the lid and malar areas is uncommon.  I am going to investigate this with one of my colleagues, Dr. Coreas and get his thoughts.  I told her I would be back to her after that.      Lida Sharp MD        D: 2021   T: 2021   MT: ms    Name:     HEMAL STROM  MRN:      [id number]        Account:      [id number]   :      1950           Service Date: 2021       Document: [id number]        Your trust in our practice and care is much appreciated.    Sincerely,  LIDA SHARP MD

## 2021-09-02 NOTE — LETTER
2021       RE: Hemal Storm  [address] [facility] [address]     Dear Colleague,    Thank you for referring your patient, Hemal Storm, to the [facility]. Please see a copy of my visit note below.    This office note has been dictated. This office note has been dictated.     20 units of Botox Placed in glabella with usual sterile technique  LIDA SHARP MD      Service Date: 2021    Hemal is in today.  She has been delighted with the filler and botox treatments with Dr. Cari Weber.  She noted after an injection in the malar area, she ended up with significant lymphedema and malar bag formations.  This was most pronounced in December and she brought in pictures of it.  Since that time, Dr. Weber has made some other injections that I cannot find in the chart, but with time, the edema is becoming less problematic.  It is more noticeable on the right than the left.  It is more of a problem in the morning or late in the day.  Malar pouches can be increased with age.  To have this occur after filler placed lower in the lid and malar areas is uncommon.  I am going to investigate this with one of my colleagues, Dr. Coreas and get his thoughts.  I told her I would be back to her after that.      Lida Sharp MD        D: 2021   T: 2021   MT: ms    Name:     HEMAL STORM  MRN:      [id number]        Account:      [id number]   :      1950           Service Date: 2021       Document: [id number]      Again, thank you for allowing me to participate in the care of your patient.      Sincerely,    LIDA SHARP MD

## 2021-09-03 NOTE — PROGRESS NOTES
Service Date: 2021    Santiago is in today.  She has been delighted with the filler and botox treatments with Dr. Cari Weber.  She noted after an injection in the malar area, she ended up with significant lymphedema and malar bag formations.  This was most pronounced in December and she brought in pictures of it.  Since that time, Dr. Weber has made some other injections that I cannot find in the chart, but with time, the edema is becoming less problematic.  It is more noticeable on the right than the left.  It is more of a problem in the morning or late in the day.  Malar pouches can be increased with age.  To have this occur after filler placed lower in the lid and malar areas is uncommon.  I am going to investigate this with one of my colleagues, Dr. Coreas and get his thoughts.  I told her I would be back to her after that.      Omi Sharp MD        D: 2021   T: 2021   MT: ms    Name:     SANTIAGO STORM  MRN:      [id number]        Account:      [id number]   :      1950           Service Date: 2021       Document: [id number]

## 2021-09-22 NOTE — PROGRESS NOTES
20 units of Botox Placed in glabella with usual sterile technique  LIDA ALATORRE MD     Health Maintenance Due   Topic Date Due   â¢ Cervical Cancer Screening HPV CO-Testing  09/29/2012   â¢ Influenza Vaccine (1) 08/01/2018       Patient is due for the topics as listed above and will call OB to schedule PAP.

## 2021-12-10 ENCOUNTER — OFFICE VISIT (OUTPATIENT)
Dept: PLASTIC SURGERY | Facility: CLINIC | Age: 71
End: 2021-12-10

## 2021-12-10 DIAGNOSIS — Z41.1 ENCOUNTER FOR COSMETIC PROCEDURE: Primary | ICD-10-CM

## 2021-12-10 NOTE — LETTER
12/10/2021       RE: Santiago Woodall  [address] [address]     Dear Colleague,    Thank you for referring your patient, Santiago Woodall, to the [facility] at [facility]. Please see a copy of my visit note below.    Facial Plastic and Reconstructive Surgery    Santiago Woodall presents for evaluation for facial aging. The patient has noticed undesirable cosmetic changes in the both the development of wrinkles and the loss of volume to the face. Santiago Woodall presents for a consultation for possible dermal filler and chemodenervation with Botox.    The patients face was evaluated with the use of a mirror and a white make up pencil to highlight the areas of concern. A recommendation was made for the optimal treatment regimen. The patient was educated on the nature of the procedures.    Dermal fillers were explained as well as the canula technique. Informed consent was obtained and noted in the chart. All questions were answered  prior to commencement of the procedure.     Botox treatment was explained and informed consent was obtained. This was documented in the chart. All questions were answered prior to proceeding.     Procedure: Dermal Filler for Facial Contouring  Indication: Facial volume deficit  Injector: Dr. Cari Weber    The skin was cleaned with antimicrobial solution and a topical ice mask was placed.   A 25 gauge needle was used to establish the entry point of the 27 gauge canula. The canula was used to inject the  a subcutaneous plane.  Intermittent ice was used topically throughout the procedure. Hemostasis was obtained at the needle entry site with light digital pressure. Ice was then applied to the face by cool pack for 10 minutes. The skin was cleaned.    A total of 2 mls of Voluma filler was used and 0.5 mls of Belotero.    Please see scanned procedure log.    There were no complications and the patient tolerated the procedure  Patient dropped off HEAD START FORM  for Dr. Nicholson to fill out. Placed the original copies in the 's slot.    When forms are completed, patient would like it:    -Please call patient to let them know it's ready      Please re-route task back to the  to shred the copied forms and complete the task. Thanks!   well.    Procedure: Chemodenervation with Botulinum Toxin A  Indication: Undesirable Dynamic Rhytids  Injector: Dr. Cari Weber    The skin was cleaned with antimicrobial solution and a topical ice mask was placed.   The patient was asked to systematically engage the muscles in the area to be injected. The tuberculin needles were used for subdermal injection and hemostasis was obtained with digital pressure when needed. The skin was cleaned.     A total of 60 units was injected subdermally. Areas treated were : glabella, forehead, bunny lines, chin and platysma bands  Please see scanned procedure log.    The patient tolerated the procedure well and there were no complications.           Again, thank you for allowing me to participate in the care of your patient.      Sincerely,    Cari Weber MD

## 2021-12-10 NOTE — PROGRESS NOTES
Facial Plastic and Reconstructive Surgery    Santiago Woodall presents for evaluation for facial aging. The patient has noticed undesirable cosmetic changes in the both the development of wrinkles and the loss of volume to the face. Santiago Woodall presents for a consultation for possible dermal filler and chemodenervation with Botox.    The patients face was evaluated with the use of a mirror and a white make up pencil to highlight the areas of concern. A recommendation was made for the optimal treatment regimen. The patient was educated on the nature of the procedures.    Dermal fillers were explained as well as the canula technique. Informed consent was obtained and noted in the chart. All questions were answered  prior to commencement of the procedure.     Botox treatment was explained and informed consent was obtained. This was documented in the chart. All questions were answered prior to proceeding.     Procedure: Dermal Filler for Facial Contouring  Indication: Facial volume deficit  Injector: Dr. Cari Weber    The skin was cleaned with antimicrobial solution and a topical ice mask was placed.   A 25 gauge needle was used to establish the entry point of the 27 gauge canula. The canula was used to inject the  a subcutaneous plane.  Intermittent ice was used topically throughout the procedure. Hemostasis was obtained at the needle entry site with light digital pressure. Ice was then applied to the face by cool pack for 10 minutes. The skin was cleaned.    A total of 2 mls of Voluma filler was used and 0.5 mls of Belotero.    Please see scanned procedure log.    There were no complications and the patient tolerated the procedure well.    Procedure: Chemodenervation with Botulinum Toxin A  Indication: Undesirable Dynamic Rhytids  Injector: Dr. Cari Weber    The skin was cleaned with antimicrobial solution and a topical ice mask was placed.   The patient was asked to systematically engage the  muscles in the area to be injected. The tuberculin needles were used for subdermal injection and hemostasis was obtained with digital pressure when needed. The skin was cleaned.     A total of 60 units was injected subdermally. Areas treated were : glabella, forehead, bunny lines, chin and platysma bands  Please see scanned procedure log.    The patient tolerated the procedure well and there were no complications.

## 2021-12-10 NOTE — LETTER
December 10, 2021  Re: Santiago Woodall  1950    Dear Dr. Rendon,    Thank you so much for referring Santiago Woodall to the [facility]. I had the pleasure of visiting with Santiago today.     Attached you will find a copy of my note. Please feel free to reach out to me with any questions, [phone].     Facial Plastic and Reconstructive Surgery    Santiago Woodall presents for evaluation for facial aging. The patient has noticed undesirable cosmetic changes in the both the development of wrinkles and the loss of volume to the face. Santiago Woodall presents for a consultation for possible dermal filler and chemodenervation with Botox.    The patients face was evaluated with the use of a mirror and a white make up pencil to highlight the areas of concern. A recommendation was made for the optimal treatment regimen. The patient was educated on the nature of the procedures.    Dermal fillers were explained as well as the canula technique. Informed consent was obtained and noted in the chart. All questions were answered  prior to commencement of the procedure.     Botox treatment was explained and informed consent was obtained. This was documented in the chart. All questions were answered prior to proceeding.     Procedure: Dermal Filler for Facial Contouring  Indication: Facial volume deficit  Injector: Dr. Cari Weber    The skin was cleaned with antimicrobial solution and a topical ice mask was placed.   A 25 gauge needle was used to establish the entry point of the 27 gauge canula. The canula was used to inject the  a subcutaneous plane.  Intermittent ice was used topically throughout the procedure. Hemostasis was obtained at the needle entry site with light digital pressure. Ice was then applied to the face by cool pack for 10 minutes. The skin was cleaned.    A total of 2 mls of Voluma filler was used and 0.5 mls of Belotero.    Please see scanned procedure log.    There were no complications and the  patient tolerated the procedure well.    Procedure: Chemodenervation with Botulinum Toxin A  Indication: Undesirable Dynamic Rhytids  Injector: Dr. Cari Weber    The skin was cleaned with antimicrobial solution and a topical ice mask was placed.   The patient was asked to systematically engage the muscles in the area to be injected. The tuberculin needles were used for subdermal injection and hemostasis was obtained with digital pressure when needed. The skin was cleaned.     A total of 60 units was injected subdermally. Areas treated were : glabella, forehead, bunny lines, chin and platysma bands  Please see scanned procedure log.    The patient tolerated the procedure well and there were no complications.         Your trust in our practice and care is much appreciated.    Sincerely,  Cari Weber MD

## 2022-05-13 ENCOUNTER — OFFICE VISIT (OUTPATIENT)
Dept: PLASTIC SURGERY | Facility: CLINIC | Age: 72
End: 2022-05-13

## 2022-08-19 ENCOUNTER — OFFICE VISIT (OUTPATIENT)
Dept: PLASTIC SURGERY | Facility: CLINIC | Age: 72
End: 2022-08-19

## 2022-08-19 DIAGNOSIS — Z41.1 ELECTIVE PROCEDURE FOR UNACCEPTABLE COSMETIC APPEARANCE: Primary | ICD-10-CM

## 2022-08-19 NOTE — LETTER
Date:August 19, 2022      Provider requested that no letter be sent. Do not send.       [facility]

## 2022-08-19 NOTE — LETTER
8/19/2022       RE: Santiago Woodall  [address] [facility] [address]     Dear Colleague,    Thank you for referring your patient, Santiago Woodall, to the [facility] at [facility]. Please see a copy of my visit note below.    Facial Plastic and Reconstructive Surgery    Procedure: Chemodenervation with Botulinum Toxin A  Indication: Undesirable Dynamic Rhytids  Injector: Cari Weber MD      Informed consent was obtained.  The skin was cleaned with antimicrobial solution and a topical ice was placed.     The patient was asked to systematically engage the muscles in the area to be injected. The tuberculin needles were used for subdermal injection and hemostasis was obtained with light digital pressure when needed. The skin was cleaned.     A total of 57 units were injected.  Botulinum toxin A type: Botox    Please see procedure log.    The patient tolerated the procedure well and there were no complications. Post procedure care instructions were given to the patient.        Again, thank you for allowing me to participate in the care of your patient.      Sincerely,    Cari Weber MD

## 2022-08-19 NOTE — LETTER
August 19, 2022  Re: Santiago Woodall  1950      Thank you so much for referring Santiago Woodall to the [facility]. I had the pleasure of visiting with Santiago today.     Attached you will find a copy of my note. Please feel free to reach out to me with any questions, [phone].     Facial Plastic and Reconstructive Surgery    Procedure: Chemodenervation with Botulinum Toxin A  Indication: Undesirable Dynamic Rhytids  Injector: Cari Weber MD      Informed consent was obtained.  The skin was cleaned with antimicrobial solution and a topical ice was placed.     The patient was asked to systematically engage the muscles in the area to be injected. The tuberculin needles were used for subdermal injection and hemostasis was obtained with light digital pressure when needed. The skin was cleaned.     A total of 57 units were injected.  Botulinum toxin A type: Botox    Please see procedure log.    The patient tolerated the procedure well and there were no complications. Post procedure care instructions were given to the patient.      Sincerely,    Cari Weber MD

## 2022-08-19 NOTE — PROGRESS NOTES
Facial Plastic and Reconstructive Surgery    Procedure: Chemodenervation with Botulinum Toxin A  Indication: Undesirable Dynamic Rhytids  Injector: Cari Weber MD      Informed consent was obtained.  The skin was cleaned with antimicrobial solution and a topical ice was placed.     The patient was asked to systematically engage the muscles in the area to be injected. The tuberculin needles were used for subdermal injection and hemostasis was obtained with light digital pressure when needed. The skin was cleaned.     A total of 57 units were injected.  Botulinum toxin A type: Botox    Please see procedure log.    The patient tolerated the procedure well and there were no complications. Post procedure care instructions were given to the patient.

## 2022-12-13 ENCOUNTER — OFFICE VISIT (OUTPATIENT)
Dept: PLASTIC SURGERY | Facility: CLINIC | Age: 72
End: 2022-12-13

## 2022-12-13 DIAGNOSIS — Z41.1 ENCOUNTER FOR COSMETIC PROCEDURE: Primary | ICD-10-CM

## 2022-12-13 NOTE — PROGRESS NOTES
Facial Plastic and Reconstructive Surgery    Santiago Woodall presents for evaluation for facial aging. The patient has noticed undesirable cosmetic changes in the both the development of wrinkles and the loss of volume to the face. Santiago Woodall presents for a consultation for possible dermal filler and chemodenervation with Botox.    The patients face was evaluated with the use of a mirror and a white make up pencil to highlight the areas of concern. A recommendation was made for the optimal treatment regimen. The patient was educated on the nature of the procedures.    Dermal fillers were explained as well as the canula technique. Informed consent was obtained and noted in the chart. All questions were answered  prior to commencement of the procedure.     Botox treatment was explained and informed consent was obtained. This was documented in the chart. All questions were answered prior to proceeding.     Procedure: Dermal Filler for Facial Contouring  Indication: Facial volume deficit  Injector: Dr. Mary Jo Gaitan MD    The skin was cleaned with antimicrobial solution and a topical ice mask was placed.     Intermittent ice was used topically throughout the procedure. Hemostasis was obtained at the needle entry site with light digital pressure. Ice was then applied to the face by cool pack for 10 minutes. The skin was cleaned.    A total of 2 mls of filler was used:   1. Voluma: Chin (0.3cc) and bilateral midface (0.7cc)   2. [place]bella: Perioral rhytids     Please see scanned procedure log.    There were no complications and the patient tolerated the procedure well.    Procedure: Chemodenervation with Botulinum Toxin A  Indication: Undesirable Dynamic Rhytids  Injector: Dr. Mary Jo Gaitan MD    The skin was cleaned with antimicrobial solution and a topical ice mask was placed.     The patient was asked to systematically engage the muscles in the area to be injected. The tuberculin needles were used for subdermal  injection and hemostasis was obtained with digital pressure when needed. The skin was cleaned.     A total of 66 units was injected subdermally. Areas treated were :   /procerus: 20u (1:1)  Forehead: 7.5u (2:1)  Brow lift: 6u (1:1)  Lateral orbicularis: 12.5u (2:1)  Chin: 5 unit(s) (1:1)  Platysmal bands: 15u (2:1)   Please see scanned procedure log.    The patient tolerated the procedure well and there were no complications.

## 2023-03-07 ENCOUNTER — OFFICE VISIT (OUTPATIENT)
Dept: PLASTIC SURGERY | Facility: CLINIC | Age: 73
End: 2023-03-07

## 2023-03-07 DIAGNOSIS — Z41.1 ENCOUNTER FOR COSMETIC PROCEDURE: Primary | ICD-10-CM

## 2023-03-07 NOTE — PROGRESS NOTES
Facial Plastic and Reconstructive Surgery Cosmetic Consultation    Referring Provider:   Referred Self, MD  No address on file    HPI:   I had the pleasure of seeing Santiago Woodall today in clinic for consultation for surgical facial rejuvenation.    Santiago Woodall is a 72 year old female. She has a history of a previous facelift and lip lift with Dr. Sharp. Her son is getting  in [place] in November and she is considering another facelift to improve her neck and jawline. She gets filler and botox.       PE:  Alert and Oriented, Answering Questions Appropriately  Atraumatic, Normocephalic, Face Symmetric  Skin: Cheney 2, thin skin   There is some lip asymmetry on examination, with the left red lip thinner and more inferiorly set compared with the right. There is a scar at the base of the left nostril.   She has blunting of her cervicomental angle and jowling. Platysmal banding.   She has a weak chin on lateral view             IMPRESSION/PLAN: Santiago Woodall is a 72 year old female here to discuss revision surgery. Her son is getting  in November. We discussed a revision deep plane lower face and neck lift, Platysmaplasty, possible submental liposuction, revision lip lift, chin augmentation (small or medium implant). I told her the first week in August would be the absolute latest she should do this to ensure her swelling is down before the wedding. I also told her to make an appointment one month before the wedding for botox and/or filler.       Photodocumentation was obtained.     Risks and benefits of the procedure were discussed, including but not limited to motor/sensory nerve damage (temporary and permanent), scarring, hematoma, irregularities of skin and underlying soft tissue, infection, asymmetry, and the need for additional procedures.

## 2023-03-07 NOTE — PROGRESS NOTES
Facial Plastic and Reconstructive Surgery    Procedure: Chemodenervation with Botulinum Toxin A  Indication: Undesirable Dynamic Rhytids  Injector: Dr. Mary Jo Gaitan MD      Informed consent was obtained.  The skin was cleaned with antimicrobial solution and a topical ice was placed.     The patient was asked to systematically engage the muscles in the area to be injected. The tuberculin needles were used for subdermal injection and hemostasis was obtained with light digital pressure when needed. The skin was cleaned.     A total of 66 units were injected.  Botulinum toxin A type: Botox    Glabella: 20 unit(s) (1:1)  Forehead: 7.5u (2:1)  Brow lift: 6u (1:1)  Crows: 15u (2:1)  Chin: 5u (1:1)  Platysmal bands: 15 unit(s) (2:1)     Please see procedure log.    The patient tolerated the procedure well and there were no complications. Post procedure care instructions were given to the patient.

## 2023-03-13 NOTE — NURSING NOTE
Updated 2D/3D photodocumentation obtained.    Gave pt a cosmetic quote for Facelift, Subnasal Lip Lift, Chin Augmentation with Chin Implant (see Media tab).    Discussed quote in great detail with pt, including the fact that the Anesthesia and Facility fees are an estimate based on time and may be subject to change.    Pt scheduled surgery with Dr. Gaitan on 7/27/23 at [facility] - $500 deposit collected.    Ashley Martin RN  3/13/2023 3:11 PM

## 2023-06-06 ENCOUNTER — OFFICE VISIT (OUTPATIENT)
Dept: PLASTIC SURGERY | Facility: CLINIC | Age: 73
End: 2023-06-06

## 2023-06-06 DIAGNOSIS — Z41.1 ENCOUNTER FOR COSMETIC PROCEDURE: Primary | ICD-10-CM

## 2023-06-06 NOTE — Clinical Note
6/6/2023       RE: Santiago Woodall  [address] [facility] [address]     Dear Colleague,    Thank you for referring your patient, Santiago Woodall, to the [facility] at [facility]. Please see a copy of my visit note below.    Facial Plastic and Reconstructive Surgery     Procedure: Chemodenervation with Botulinum Toxin A  Indication: Undesirable Dynamic Rhytids  Injector: Dr. Mary Jo Gaitan MD        Informed consent was obtained.  The skin was cleaned with antimicrobial solution and a topical ice was placed.      The patient was asked to systematically engage the muscles in the area to be injected. The tuberculin needles were used for subdermal injection and hemostasis was obtained with light digital pressure when needed. The skin was cleaned.      A total of 66 units were injected.  Botulinum toxin A type: Botox     Glabella: 20 unit(s) (1:1)  Forehead: 7.5u (2:1)  Brow lift: 6u (1:1)  Crows: 15u (2:1)  Chin: 5u (1:1)  Platysmal bands: 15 unit(s) (2:1)      Please see procedure log.     The patient tolerated the procedure well and there were no complications. Post procedure care instructions were given to the patient.       Facial Plastic and Reconstructive Surgery     Procedure: Chemodenervation with Botulinum Toxin A  Indication: Undesirable Dynamic Rhytids  Injector: Dr. Mary Jo Gaitan MD        Informed consent was obtained.  The skin was cleaned with antimicrobial solution and a topical ice was placed.      The patient was asked to systematically engage the muscles in the area to be injected. The tuberculin needles were used for subdermal injection and hemostasis was obtained with light digital pressure when needed. The skin was cleaned.      A total of 66 units were injected.  Botulinum toxin A type: Botox     Glabella: 20 unit(s) (1:1)  Forehead: 7.5u (2:1)  Brow lift: 6u (1:1)  Crows: 15u (2:1)  Chin: 5u (1:1)  Platysmal bands: 15 unit(s)  (2:1)     Wedding in November. Going to come in September for botox/filler. Planning to do revision face lift and chin augmentation around the holidays-- we could get that scheduled at her next visit.      Please see procedure log.     The patient tolerated the procedure well and there were no complications. Post procedure care instructions were given to the patient.         Again, thank you for allowing me to participate in the care of your patient.      Sincerely,    Mary Jo Gaitan MD

## 2023-06-06 NOTE — PROGRESS NOTES
Facial Plastic and Reconstructive Surgery     Procedure: Chemodenervation with Botulinum Toxin A  Indication: Undesirable Dynamic Rhytids  Injector: Dr. Mary Jo Gaitan MD        Informed consent was obtained.  The skin was cleaned with antimicrobial solution and a topical ice was placed.      The patient was asked to systematically engage the muscles in the area to be injected. The tuberculin needles were used for subdermal injection and hemostasis was obtained with light digital pressure when needed. The skin was cleaned.      A total of 66 units were injected.  Botulinum toxin A type: Botox     Glabella: 20 unit(s) (1:1)  Forehead: 7.5u (2:1)  Brow lift: 6u (1:1)  Crows: 15u (2:1)  Chin: 5u (1:1)  Platysmal bands: 15 unit(s) (2:1)     Wedding in November. Going to come in September for botox/filler. Planning to do revision face lift and chin augmentation around the holidays-- we could get that scheduled at her next visit.      Please see procedure log.     The patient tolerated the procedure well and there were no complications. Post procedure care instructions were given to the patient.

## 2023-09-26 ENCOUNTER — OFFICE VISIT (OUTPATIENT)
Dept: PLASTIC SURGERY | Facility: CLINIC | Age: 73
End: 2023-09-26

## 2023-09-26 DIAGNOSIS — Z41.1 ENCOUNTER FOR COSMETIC PROCEDURE: Primary | ICD-10-CM

## 2023-09-26 NOTE — Clinical Note
September 26, 2023  Re: Santiago Woodall  1950    Dear Dr. Rendon,    Thank you so much for referring Santiago Woodall to the [facility]. I had the pleasure of visiting with Santiago today.     Attached you will find a copy of my note. Please feel free to reach out to me with any questions, [phone].     Updated photodocumentation obtained.    Ainsley Cardona RN  9/26/2023 1:21 PM      Facial Plastic and Reconstructive Surgery    Santiago Woodall presents for evaluation for facial aging. The patient has noticed undesirable cosmetic changes in the both the development of wrinkles and the loss of volume to the face. Santiago Woodall presents for a consultation for possible dermal filler and chemodenervation with Botox.    The patients face was evaluated with the use of a mirror and a white make up pencil to highlight the areas of concern. A recommendation was made for the optimal treatment regimen. The patient was educated on the nature of the procedures.    Dermal fillers were explained as well as the canula technique. Informed consent was obtained and noted in the chart. All questions were answered  prior to commencement of the procedure.     Botox treatment was explained and informed consent was obtained. This was documented in the chart. All questions were answered prior to proceeding.     Procedure: Dermal Filler for Facial Contouring  Indication: Facial volume deficit  Injector: Dr. aMry Jo Gaitan MD    The skin was cleaned with antimicrobial solution and a topical ice mask was placed.     A 25 gauge needle was used to establish the entry point of the 27 gauge canula. The canula was used to inject the  a subcutaneous plane.  Intermittent ice was used topically throughout the procedure. Hemostasis was obtained at the needle entry site with light digital pressure. Ice was then applied to the face by cool pack for 10 minutes. The skin was cleaned.    A total of 4.5 mls of filler used.     Voluma x 1  midface  Defyne x 2 midface and perioral  Resylane 0.5cc x 1: perioral  Belotero x 1: perioral     Please see scanned procedure log.    There were no complications and the patient tolerated the procedure well.    Procedure: Chemodenervation with Botulinum Toxin A  Indication: Undesirable Dynamic Rhytids  Injector: Dr. Mary Jo Gaitan MD    The skin was cleaned with antimicrobial solution and a topical ice mask was placed.     The patient was asked to systematically engage the muscles in the area to be injected. The tuberculin needles were used for subdermal injection and hemostasis was obtained with digital pressure when needed. The skin was cleaned.     A total of 70 units was injected subdermally. Areas treated were :   Glabella: 20u (1:1)  Forehead: 8u (2:1)  Brow lift: 6u (1:1)  Lateral orbic: 15u (2:1)  Chin: 6u (1:1)   Platysmal bands: 15u (2:1)     Please see scanned procedure log.    The patient tolerated the procedure well and there were no complications.               Your trust in our practice and care is much appreciated.    Sincerely,  Mary Jo Gaitan MD

## 2023-09-26 NOTE — Clinical Note
9/26/2023       RE: Santiago Woodall  [address] [facility] [address]     Dear Colleague,    Thank you for referring your patient, Santiago Woodall, to the [facility] at [facility]. Please see a copy of my visit note below.    Updated photodocumentation obtained.    Ainsley Cardona RN  9/26/2023 1:21 PM      Facial Plastic and Reconstructive Surgery    Santiago Woodall presents for evaluation for facial aging. The patient has noticed undesirable cosmetic changes in the both the development of wrinkles and the loss of volume to the face. Santiago Woodall presents for a consultation for possible dermal filler and chemodenervation with Botox.    The patients face was evaluated with the use of a mirror and a white make up pencil to highlight the areas of concern. A recommendation was made for the optimal treatment regimen. The patient was educated on the nature of the procedures.    Dermal fillers were explained as well as the canula technique. Informed consent was obtained and noted in the chart. All questions were answered  prior to commencement of the procedure.     Botox treatment was explained and informed consent was obtained. This was documented in the chart. All questions were answered prior to proceeding.     Procedure: Dermal Filler for Facial Contouring  Indication: Facial volume deficit  Injector: Dr. Mary Jo Gaitan MD    The skin was cleaned with antimicrobial solution and a topical ice mask was placed.     A 25 gauge needle was used to establish the entry point of the 27 gauge canula. The canula was used to inject the  a subcutaneous plane.  Intermittent ice was used topically throughout the procedure. Hemostasis was obtained at the needle entry site with light digital pressure. Ice was then applied to the face by cool pack for 10 minutes. The skin was cleaned.    A total of 4.5 mls of filler used.     Voluma x 1 midface  Defyne x 2  midface and perioral  Resylane 0.5cc x 1: perioral  Belotero x 1: perioral     Please see scanned procedure log.    There were no complications and the patient tolerated the procedure well.    Procedure: Chemodenervation with Botulinum Toxin A  Indication: Undesirable Dynamic Rhytids  Injector: Dr. Mary Jo Gaitan MD    The skin was cleaned with antimicrobial solution and a topical ice mask was placed.     The patient was asked to systematically engage the muscles in the area to be injected. The tuberculin needles were used for subdermal injection and hemostasis was obtained with digital pressure when needed. The skin was cleaned.     A total of 70 units was injected subdermally. Areas treated were :   Glabella: 20u (1:1)  Forehead: 8u (2:1)  Brow lift: 6u (1:1)  Lateral orbic: 15u (2:1)  Chin: 6u (1:1)   Platysmal bands: 15u (2:1)     Please see scanned procedure log.    The patient tolerated the procedure well and there were no complications.             Again, thank you for allowing me to participate in the care of your patient.      Sincerely,    Mary Jo Gaitan MD

## 2023-09-26 NOTE — PROGRESS NOTES
Facial Plastic and Reconstructive Surgery    Santiago Woodall presents for evaluation for facial aging. The patient has noticed undesirable cosmetic changes in the both the development of wrinkles and the loss of volume to the face. Santiago Woodall presents for a consultation for possible dermal filler and chemodenervation with Botox.    The patients face was evaluated with the use of a mirror and a white make up pencil to highlight the areas of concern. A recommendation was made for the optimal treatment regimen. The patient was educated on the nature of the procedures.    Dermal fillers were explained as well as the canula technique. Informed consent was obtained and noted in the chart. All questions were answered  prior to commencement of the procedure.     Botox treatment was explained and informed consent was obtained. This was documented in the chart. All questions were answered prior to proceeding.     Procedure: Dermal Filler for Facial Contouring  Indication: Facial volume deficit  Injector: Dr. Mary Jo Gaitan MD    The skin was cleaned with antimicrobial solution and a topical ice mask was placed.     A 25 gauge needle was used to establish the entry point of the 27 gauge canula. The canula was used to inject the  a subcutaneous plane.  Intermittent ice was used topically throughout the procedure. Hemostasis was obtained at the needle entry site with light digital pressure. Ice was then applied to the face by cool pack for 10 minutes. The skin was cleaned.    A total of 4.5 mls of filler used.     Voluma x 1 midface  Defyne x 2 midface and perioral  Resylane 0.5cc x 1: perioral  Belotero x 1: perioral     Please see scanned procedure log.    There were no complications and the patient tolerated the procedure well.    Procedure: Chemodenervation with Botulinum Toxin A  Indication: Undesirable Dynamic Rhytids  Injector: Dr. Mary Jo Gaitan MD    The skin was cleaned with antimicrobial solution and a  topical ice mask was placed.     The patient was asked to systematically engage the muscles in the area to be injected. The tuberculin needles were used for subdermal injection and hemostasis was obtained with digital pressure when needed. The skin was cleaned.     A total of 70 units was injected subdermally. Areas treated were :   Glabella: 20u (1:1)  Forehead: 8u (2:1)  Brow lift: 6u (1:1)  Lateral orbic: 15u (2:1)  Chin: 6u (1:1)   Platysmal bands: 15u (2:1)     Please see scanned procedure log.    The patient tolerated the procedure well and there were no complications.

## 2023-10-24 ENCOUNTER — OFFICE VISIT (OUTPATIENT)
Dept: PLASTIC SURGERY | Facility: CLINIC | Age: 73
End: 2023-10-24

## 2023-10-24 DIAGNOSIS — Z41.1 ENCOUNTER FOR COSMETIC PROCEDURE: Primary | ICD-10-CM

## 2023-10-24 NOTE — PROGRESS NOTES
Facial Plastic and Reconstructive Surgery      Santiago Woodall is here for follow up from her filler and Botox at the end of September. She looks great. No further treatment needed at this time. She has her son's wedding in 2.5 weeks and is having surgery for a kidney stone this week. We will see her in a few months, sooner if there are issues.     Mary Jo Gaitan MD

## 2023-10-24 NOTE — Clinical Note
October 24, 2023  Re: Santiago Woodall  1950    Dear Dr. Rendon,    Thank you so much for referring Santiago Woodall to the [facility]. I had the pleasure of visiting with Santiago today.     Attached you will find a copy of my note. Please feel free to reach out to me with any questions, [phone].     Updated photodocumentation obtained.    Obtained signed informed consent for filler (see media tab).    Ainsley Cardona RN  10/24/2023 1:11 PM          Your trust in our practice and care is much appreciated.    Sincerely,  Mary Jo Gaitan MD

## 2023-10-24 NOTE — PROGRESS NOTES
Updated photodocumentation obtained.    Obtained signed informed consent for filler (see media tab).    Ainsley Cardona RN  10/24/2023 1:11 PM

## 2023-10-24 NOTE — Clinical Note
10/24/2023       RE: Santiago Woodall  [address] [facility] [address]     Dear Colleague,    Thank you for referring your patient, Santiago Woodall, to the [facility] at . Please see a copy of my visit note below.    Updated photodocumentation obtained.    Obtained signed informed consent for filler (see media tab).    Ainsley Cardona RN  10/24/2023 1:11 PM        Again, thank you for allowing me to participate in the care of your patient.      Sincerely,    Mary Jo Gaitan MD

## 2024-01-16 ENCOUNTER — OFFICE VISIT (OUTPATIENT)
Dept: PLASTIC SURGERY | Facility: CLINIC | Age: 74
End: 2024-01-16

## 2024-01-16 DIAGNOSIS — Z41.1 ENCOUNTER FOR COSMETIC PROCEDURE: Primary | ICD-10-CM

## 2024-01-16 NOTE — PROGRESS NOTES
Facial Plastic and Reconstructive Surgery     Santiago Woodall presents for evaluation for facial aging. The patient has noticed undesirable cosmetic changes in the both the development of wrinkles and the loss of volume to the face. Santiago Woodall presents for a consultation for possible dermal filler and chemodenervation with Botox.     The patients face was evaluated with the use of a mirror and a white make up pencil to highlight the areas of concern. A recommendation was made for the optimal treatment regimen. The patient was educated on the nature of the procedures.     Dermal fillers were explained as well as the canula technique. Informed consent was obtained and noted in the chart. All questions were answered  prior to commencement of the procedure.      Botox treatment was explained and informed consent was obtained. This was documented in the chart. All questions were answered prior to proceeding.      Procedure: Dermal Filler for Facial Contouring  Indication: Facial volume deficit  Injector: Dr. Mary Jo Gaitan MD     The skin was cleaned with antimicrobial solution and a topical ice mask was placed.      A 25 gauge needle was used to establish the entry point of the 27 gauge canula. The canula was used to inject the  a subcutaneous plane.  Intermittent ice was used topically throughout the procedure. Hemostasis was obtained at the needle entry site with light digital pressure. Ice was then applied to the face by cool pack for 10 minutes. The skin was cleaned.     A total of 2 mls of filler used.      Voluma x 1 midface  Belotero x 1: perioral      Please see scanned procedure log.     There were no complications and the patient tolerated the procedure well.     Procedure: Chemodenervation with Botulinum Toxin A  Indication: Undesirable Dynamic Rhytids  Injector: Dr. Mary Jo Gaitan MD     The skin was cleaned with antimicrobial solution and a topical ice mask was placed.      The patient was asked  to systematically engage the muscles in the area to be injected. The tuberculin needles were used for subdermal injection and hemostasis was obtained with digital pressure when needed. The skin was cleaned.      A total of 70 units was injected subdermally. Areas treated were :   Glabella: 20u (1:1)  Forehead: 8u (2:1)  Brow lift: 6u (1:1)  Lateral orbic: 15u (2:1)  Chin: 6u (1:1)   Platysmal bands: 15u (2:1)      Please see scanned procedure log.     The patient tolerated the procedure well and there were no complications.

## 2024-01-16 NOTE — PROGRESS NOTES
Updated photodocumentation obtained (see media tab).    Obtained signed informed consent for filler (see media tab).    Ainsley Cardona RN  1/16/2024 11:34 AM

## 2024-03-30 NOTE — Clinical Note
June 6, 2023  Re: Santiago Woodall  1950    Dear Dr. Rendon,    Thank you so much for referring Santiago Woodall to the [facility]. I had the pleasure of visiting with Santiago today.     Attached you will find a copy of my note. Please feel free to reach out to me with any questions, [phone].     Facial Plastic and Reconstructive Surgery     Procedure: Chemodenervation with Botulinum Toxin A  Indication: Undesirable Dynamic Rhytids  Injector: Dr. Mary Jo Gaitan MD        Informed consent was obtained.  The skin was cleaned with antimicrobial solution and a topical ice was placed.      The patient was asked to systematically engage the muscles in the area to be injected. The tuberculin needles were used for subdermal injection and hemostasis was obtained with light digital pressure when needed. The skin was cleaned.      A total of 66 units were injected.  Botulinum toxin A type: Botox     Glabella: 20 unit(s) (1:1)  Forehead: 7.5u (2:1)  Brow lift: 6u (1:1)  Crows: 15u (2:1)  Chin: 5u (1:1)  Platysmal bands: 15 unit(s) (2:1)      Please see procedure log.     The patient tolerated the procedure well and there were no complications. Post procedure care instructions were given to the patient.       Facial Plastic and Reconstructive Surgery     Procedure: Chemodenervation with Botulinum Toxin A  Indication: Undesirable Dynamic Rhytids  Injector: Dr. Mary Jo Gaitan MD        Informed consent was obtained.  The skin was cleaned with antimicrobial solution and a topical ice was placed.      The patient was asked to systematically engage the muscles in the area to be injected. The tuberculin needles were used for subdermal injection and hemostasis was obtained with light digital pressure when needed. The skin was cleaned.      A total of 66 units were injected.  Botulinum toxin A type: Botox     Glabella: 20 unit(s) (1:1)  Forehead: 7.5u (2:1)  Brow lift: 6u (1:1)  Crows: 15u (2:1)  Chin: 5u (1:1)  Platysmal bands:  15 unit(s) (2:1)     Wedding in November. Going to come in September for botox/filler. Planning to do revision face lift and chin augmentation around the holidays-- we could get that scheduled at her next visit.      Please see procedure log.     The patient tolerated the procedure well and there were no complications. Post procedure care instructions were given to the patient.           Your trust in our practice and care is much appreciated.    Sincerely,  Mary Jo Gaitan MD No

## 2024-04-23 ENCOUNTER — OFFICE VISIT (OUTPATIENT)
Dept: PLASTIC SURGERY | Facility: CLINIC | Age: 74
End: 2024-04-23

## 2024-04-23 DIAGNOSIS — Z41.1 ENCOUNTER FOR COSMETIC PROCEDURE: Primary | ICD-10-CM

## 2024-04-23 NOTE — PROGRESS NOTES
Facial Plastic and Reconstructive Surgery    Santiago Woodall presents for evaluation for facial aging. The patient has noticed undesirable cosmetic changes in the both the development of wrinkles and the loss of volume to the face. Santiago Woodall presents for a consultation for possible dermal filler and chemodenervation with Botox.    The patients face was evaluated with the use of a mirror and a white make up pencil to highlight the areas of concern. A recommendation was made for the optimal treatment regimen. The patient was educated on the nature of the procedures.    Dermal fillers were explained as well as the canula technique. Informed consent was obtained and noted in the chart. All questions were answered  prior to commencement of the procedure.     Botox treatment was explained and informed consent was obtained. This was documented in the chart. All questions were answered prior to proceeding.     Procedure: Dermal Filler for Facial Contouring  Indication: Facial volume deficit  Injector: Dr. Mary Jo Gaitan MD    The skin was cleaned with antimicrobial solution and a topical ice mask was placed.     A 25 gauge needle was used to establish the entry point of the 27 gauge canula. The canula was used to inject the  a subcutaneous plane.  Intermittent ice was used topically throughout the procedure. Hemostasis was obtained at the needle entry site with light digital pressure. Ice was then applied to the face by cool pack for 10 minutes. The skin was cleaned.    A total of 2 mls of  filler was used.   -Voluma: cheeks  -Belotero: perioral lines     Please see scanned procedure log.    There were no complications and the patient tolerated the procedure well.    Procedure: Chemodenervation with Botulinum Toxin A  Indication: Undesirable Dynamic Rhytids  Injector: Dr. Mary Jo Gaitan MD    The skin was cleaned with antimicrobial solution and a topical ice mask was placed.     The patient was asked to  systematically engage the muscles in the area to be injected. The tuberculin needles were used for subdermal injection and hemostasis was obtained with digital pressure when needed. The skin was cleaned.     A total of 70 units was injected subdermally. Areas treated were :   Glabella: 20u (1:1)  Forehead: 8u (2:1)  Brow lift: 6u (1:1)  Chin: 6u (1:1)  Lateral orbic: 15u (2:1)  Platysmal bands: 15u (2:1)      Please see scanned procedure log.    The patient tolerated the procedure well and there were no complications.     At her next visit, we are going to discuss a revision face and neck lift with LEFT subnasal lip lift. She is interested in October.

## 2024-07-23 ENCOUNTER — OFFICE VISIT (OUTPATIENT)
Dept: PLASTIC SURGERY | Facility: CLINIC | Age: 74
End: 2024-07-23

## 2024-07-23 DIAGNOSIS — Z41.1 ENCOUNTER FOR COSMETIC PROCEDURE: Primary | ICD-10-CM

## 2024-07-23 NOTE — PROGRESS NOTES
Facial Plastic and Reconstructive Surgery     Santiago Woodall presents for evaluation for facial aging. The patient has noticed undesirable cosmetic changes in the both the development of wrinkles and the loss of volume to the face. Santiago Woodall presents for a consultation for possible dermal filler and chemodenervation with Botox.     The patients face was evaluated with the use of a mirror and a white make up pencil to highlight the areas of concern. A recommendation was made for the optimal treatment regimen. The patient was educated on the nature of the procedures.     Dermal fillers were explained as well as the canula technique. Informed consent was obtained and noted in the chart. All questions were answered  prior to commencement of the procedure.      Botox treatment was explained and informed consent was obtained. This was documented in the chart. All questions were answered prior to proceeding.      Procedure: Dermal Filler for Facial Contouring  Indication: Facial volume deficit  Injector: Dr. Mary Jo Gaitan MD     The skin was cleaned with antimicrobial solution and a topical ice mask was placed.      A 25 gauge needle was used to establish the entry point of the 27 gauge canula. The canula was used to inject the  a subcutaneous plane.  Intermittent ice was used topically throughout the procedure. Hemostasis was obtained at the needle entry site with light digital pressure. Ice was then applied to the face by cool pack for 10 minutes. The skin was cleaned.     A total of 1 mls of  filler was used.   -Belotero: perioral lines      Please see scanned procedure log.     There were no complications and the patient tolerated the procedure well.     Procedure: Chemodenervation with Botulinum Toxin A  Indication: Undesirable Dynamic Rhytids  Injector: Dr. Mary Jo Gaitan MD     The skin was cleaned with antimicrobial solution and a topical ice mask was placed.      The patient was asked to  systematically engage the muscles in the area to be injected. The tuberculin needles were used for subdermal injection and hemostasis was obtained with digital pressure when needed. The skin was cleaned.      A total of 70 units was injected subdermally. Areas treated were :   Glabella: 20u (1:1)  Forehead: 8u (2:1)  Brow lift: 6u (1:1)  Chin: 6u (1:1)  Lateral orbic: 15u (2:1)  Platysmal bands: 15u (2:1)        Please see scanned procedure log.     The patient tolerated the procedure well and there were no complications.          We discussed surgery today. She is most bothered by her neck, lines around her mouth. We also discussed the volume loss she has in the midface and adding some projection to her chin (we have put filler there in the past).     We discussed a revision deep plane lower face and neck lift (no submentoplasty/plastysmaplasty/liposuction needed), chin augmentation (small or medium), full face fat grafting (midface, temples, etc). She is a good candidate for this. Risks and benefits were discussed including but not limited to bleeding, hematoma, infection, scarring, asymmetry, irregularities, numbness/tingling (temporary or permanent), damage to motor nerves (temporary or permanent), need for additional procedures. I discussed that with a revision she is at a higher risk for some of these complications as well as some irregularities postoperatively.     She would like to do the surgery in October.

## 2024-09-04 NOTE — PROGRESS NOTES
Facial Plastic and Reconstructive Surgery      Santiago Woodall is a 73 year old female. She is here today to touch base before her surgery (scheduled for revision face/neck lift, chin augmentation, full face fat grafting on 9/17/24).     We injected 0.2cc of hylenex to the chin to dissolve any filler there.     She is all set for surgery. I encouraged her to reach out with any questions or concerns.     Mary Jo Gaitan MD

## 2024-09-05 ENCOUNTER — OFFICE VISIT (OUTPATIENT)
Dept: PLASTIC SURGERY | Facility: CLINIC | Age: 74
End: 2024-09-05

## 2024-09-05 DIAGNOSIS — Z41.1 ENCOUNTER FOR COSMETIC PROCEDURE: Primary | ICD-10-CM

## 2024-09-16 DIAGNOSIS — Z98.890 POSTOPERATIVE STATE: Primary | ICD-10-CM

## 2024-09-16 RX ORDER — ONDANSETRON 4 MG/1
4 TABLET, ORALLY DISINTEGRATING ORAL EVERY 8 HOURS PRN
Qty: 12 TABLET | Refills: 0 | Status: SHIPPED | OUTPATIENT
Start: 2024-09-16

## 2024-09-16 RX ORDER — OXYCODONE HYDROCHLORIDE 5 MG/1
5 TABLET ORAL EVERY 6 HOURS PRN
Qty: 10 TABLET | Refills: 0 | Status: SHIPPED | OUTPATIENT
Start: 2024-09-16 | End: 2024-09-19

## 2024-09-16 RX ORDER — MINERAL OIL/HYDROPHIL PETROLAT
OINTMENT (GRAM) TOPICAL
Qty: 50 G | Refills: 11 | Status: SHIPPED | OUTPATIENT
Start: 2024-09-16

## 2024-09-17 ENCOUNTER — TRANSFERRED RECORDS (OUTPATIENT)
Dept: HEALTH INFORMATION MANAGEMENT | Facility: CLINIC | Age: 74
End: 2024-09-17

## 2024-09-18 ENCOUNTER — OFFICE VISIT (OUTPATIENT)
Dept: PLASTIC SURGERY | Facility: CLINIC | Age: 74
End: 2024-09-18

## 2024-09-18 DIAGNOSIS — Z98.890 POSTOPERATIVE STATE: Primary | ICD-10-CM

## 2024-09-18 NOTE — NURSING NOTE
Pt comes in POD #1 s/p Revision Face/Neck Lift, Chin Augmentation, & Full Face Fat Grafting.     Head dressing removed. Bilateral head JPs had minimal sanguineous output over the past 24 hrs.  Bilateral JPs removed.     All incisions are well approximated.  Mild face and neck edema present with minimal bruising.     Wound cares explained and demonstrated for pt.  Cleansed incision with H202 and applied Aquaphor.  Telfa placed over incisions.  4x4 gauze placed over telfa and donned jaw bra.     Pt given extra wound care supplies for home.  We discussed s/s of hematoma and when to notify the clinic.     Pt verbalized understanding.     Follow-up in one week for suture/staple removal.    Paris Browne RN  09/18/24 10:59 AM

## 2024-09-24 ENCOUNTER — ALLIED HEALTH/NURSE VISIT (OUTPATIENT)
Dept: PLASTIC SURGERY | Facility: CLINIC | Age: 74
End: 2024-09-24

## 2024-09-24 DIAGNOSIS — Z41.1 ENCOUNTER FOR COSMETIC PROCEDURE: Primary | ICD-10-CM

## 2024-09-24 NOTE — PROGRESS NOTES
Facial Plastic and Reconstructive Surgery        Santiago Woodall is a 73 year old female s/p revision face/neck lift, chin augmentation, full face fat grafting on 9/17/24.      Today, she reports ***    On exam, ***  All sutures and staples removed.      Wound care discussed.   We will see her back in 1 month, sooner if there are issues.      Mary Jo Gaitan MD

## 2024-09-25 NOTE — PROGRESS NOTES
Pt came in to clinic today POD#7 s/p Revision Face/Neck Lift, Chin Augmentation, & Full Face Fat Grafting.     Sutures and staple were removed from both sides of head and under pt's chin. Incisions appear well approximated and appropriately red and swollen.     Pt instructed to keep a sheen of Aquaphor over incisions at all times for the next month or until incisions are fully healed.     Pt will follow-up with Dr. Gaitan in one month, or sooner if issues.     Paris Browne RN  09/25/24 2:50 PM

## 2024-10-22 ENCOUNTER — OFFICE VISIT (OUTPATIENT)
Dept: PLASTIC SURGERY | Facility: CLINIC | Age: 74
End: 2024-10-22

## 2024-10-22 DIAGNOSIS — Z41.1 ENCOUNTER FOR COSMETIC PROCEDURE: Primary | ICD-10-CM

## 2024-10-22 NOTE — PROGRESS NOTES
Facial Plastic and Reconstructive Surgery      Santiago Woodall is s/p Revision Face/Neck Lift, Chin Augmentation, & Full Face Fat Grafting on 9/17/24.     Today, she reports she is doing great. Happy with results. Minimal bruising. Swelling continues to improve.     On exam, she looks great! Incisions are healing nicely. Some erythema as expected. Some submental fullness.     A/P: She is now 1 month s/p revision Face/Neck Lift, Chin Augmentation, & Full Face Fat Grafting on 9/17/24.   She is looking great!  Wound care discussed.   I would like to see her back in 3-4 months, sooner if there are issues.     Mary Jo Gaitan MD

## 2025-04-05 ENCOUNTER — HEALTH MAINTENANCE LETTER (OUTPATIENT)
Age: 75
End: 2025-04-05